# Patient Record
Sex: OTHER/UNKNOWN | Race: WHITE | URBAN - METROPOLITAN AREA
[De-identification: names, ages, dates, MRNs, and addresses within clinical notes are randomized per-mention and may not be internally consistent; named-entity substitution may affect disease eponyms.]

---

## 2017-05-24 ENCOUNTER — APPOINTMENT (OUTPATIENT)
Dept: URBAN - METROPOLITAN AREA CLINIC 290 | Age: 32
Setting detail: DERMATOLOGY
End: 2017-05-26

## 2017-05-24 DIAGNOSIS — L21.8 OTHER SEBORRHEIC DERMATITIS: ICD-10-CM

## 2017-05-24 PROBLEM — L30.9 DERMATITIS, UNSPECIFIED: Status: ACTIVE | Noted: 2017-05-24

## 2017-05-24 PROCEDURE — 87220 TISSUE EXAM FOR FUNGI: CPT

## 2017-05-24 PROCEDURE — OTHER KOH PREP: OTHER

## 2017-05-24 PROCEDURE — OTHER TREATMENT REGIMEN: OTHER

## 2017-05-24 PROCEDURE — 99202 OFFICE O/P NEW SF 15 MIN: CPT

## 2017-05-24 PROCEDURE — OTHER PRESCRIPTION: OTHER

## 2017-05-24 PROCEDURE — OTHER COUNSELING: OTHER

## 2017-05-24 RX ORDER — KETOCONAZOLE 20 MG/G
CREAM TOPICAL
Qty: 60 | Refills: 0 | Status: ERX | COMMUNITY
Start: 2017-05-24

## 2017-05-24 ASSESSMENT — LOCATION DETAILED DESCRIPTION DERM
LOCATION DETAILED: RIGHT CENTRAL MALAR CHEEK
LOCATION DETAILED: LEFT CENTRAL MALAR CHEEK

## 2017-05-24 ASSESSMENT — LOCATION SIMPLE DESCRIPTION DERM
LOCATION SIMPLE: LEFT CHEEK
LOCATION SIMPLE: RIGHT CHEEK

## 2017-05-24 ASSESSMENT — LOCATION ZONE DERM: LOCATION ZONE: FACE

## 2017-05-24 NOTE — PROCEDURE: KOH PREP
Detail Level: Zone
Showing: no pathologic findings
Add  To Bill: No
Koh Intro Text (From The.....): A KOH prep was ordered and evaluated from the right forehead